# Patient Record
Sex: MALE | Race: WHITE | HISPANIC OR LATINO | Employment: FULL TIME | URBAN - METROPOLITAN AREA
[De-identification: names, ages, dates, MRNs, and addresses within clinical notes are randomized per-mention and may not be internally consistent; named-entity substitution may affect disease eponyms.]

---
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
## 2020-02-03 ENCOUNTER — TELEPHONE (OUTPATIENT)
Dept: SCHEDULING | Facility: IMAGING CENTER | Age: 20
End: 2020-02-03

## 2023-10-03 ENCOUNTER — OFFICE VISIT (OUTPATIENT)
Dept: URGENT CARE | Facility: CLINIC | Age: 23
End: 2023-10-03
Payer: MEDICAID

## 2023-10-03 ENCOUNTER — APPOINTMENT (OUTPATIENT)
Dept: RADIOLOGY | Facility: IMAGING CENTER | Age: 23
End: 2023-10-03
Attending: NURSE PRACTITIONER
Payer: MEDICAID

## 2023-10-03 ENCOUNTER — APPOINTMENT (OUTPATIENT)
Dept: URGENT CARE | Facility: CLINIC | Age: 23
End: 2023-10-03

## 2023-10-03 ENCOUNTER — APPOINTMENT (OUTPATIENT)
Dept: URGENT CARE | Facility: CLINIC | Age: 23
End: 2023-10-03
Payer: MEDICAID

## 2023-10-03 VITALS
BODY MASS INDEX: 31.39 KG/M2 | HEART RATE: 85 BPM | DIASTOLIC BLOOD PRESSURE: 88 MMHG | SYSTOLIC BLOOD PRESSURE: 136 MMHG | WEIGHT: 200 LBS | TEMPERATURE: 99 F | OXYGEN SATURATION: 98 % | RESPIRATION RATE: 16 BRPM | HEIGHT: 67 IN

## 2023-10-03 DIAGNOSIS — M25.571 ARTHRALGIA OF RIGHT FOOT: ICD-10-CM

## 2023-10-03 DIAGNOSIS — M79.674 TOE PAIN, RIGHT: ICD-10-CM

## 2023-10-03 DIAGNOSIS — L60.0 INGROWN TOENAIL: ICD-10-CM

## 2023-10-03 PROCEDURE — 3079F DIAST BP 80-89 MM HG: CPT | Performed by: NURSE PRACTITIONER

## 2023-10-03 PROCEDURE — 73660 X-RAY EXAM OF TOE(S): CPT | Mod: TC,RT | Performed by: NURSE PRACTITIONER

## 2023-10-03 PROCEDURE — 99204 OFFICE O/P NEW MOD 45 MIN: CPT | Performed by: NURSE PRACTITIONER

## 2023-10-03 PROCEDURE — 3075F SYST BP GE 130 - 139MM HG: CPT | Performed by: NURSE PRACTITIONER

## 2023-10-03 RX ORDER — FLUTICASONE PROPIONATE 44 UG/1
AEROSOL, METERED RESPIRATORY (INHALATION)
COMMUNITY
Start: 2023-08-02 | End: 2024-03-04 | Stop reason: SDUPTHER

## 2023-10-03 RX ORDER — ALBUTEROL SULFATE 0.63 MG/3ML
1 SOLUTION RESPIRATORY (INHALATION)
COMMUNITY
End: 2024-03-04

## 2023-10-03 RX ORDER — INDOMETHACIN 50 MG/1
50 CAPSULE ORAL 3 TIMES DAILY
Qty: 90 CAPSULE | Refills: 0 | Status: SHIPPED | OUTPATIENT
Start: 2023-10-03 | End: 2024-03-04

## 2023-10-03 ASSESSMENT — ENCOUNTER SYMPTOMS
JOINT SWELLING: 1
WEAKNESS: 0
NUMBNESS: 0

## 2023-10-04 NOTE — PROGRESS NOTES
"Subjective:   Shaggy Santiago Jr. is a 23 y.o. male who presents for Foot Swelling and Nail Problem      Foot Swelling  This is a new problem. The current episode started yesterday (left foot pain, no injury). The problem occurs constantly. The problem has been gradually worsening. Associated symptoms include joint swelling. Pertinent negatives include no numbness or weakness. The symptoms are aggravated by walking. He has tried acetaminophen for the symptoms. The treatment provided no relief.   Patient has no history of gout.  Does have an ingrown toenail on the right great toe was uncertain if it may be attributing to the swelling.  He has no pain of the ingrown.  Is scheduled to see a podiatrist to have ingrown toenails removed.    Review of Systems   Musculoskeletal:  Positive for joint pain and joint swelling.   Neurological:  Negative for weakness and numbness.       Medications:    albuterol  fluticasone Aero    Allergies: Patient has no known allergies.    Problem List: Shaggy Santiago Jr. does not have a problem list on file.    Surgical History:  No past surgical history on file.    Past Social Hx: Shaggy Santiago Jr.       Past Family Hx:  Shaggy Santiago Jr. family history is not on file.     Problem list, medications, and allergies reviewed by myself today in Epic.     Objective:     /88 (BP Location: Left arm, Patient Position: Sitting, BP Cuff Size: Adult)   Pulse 85   Temp 37.2 °C (99 °F) (Temporal)   Resp 16   Ht 1.702 m (5' 7\")   Wt 90.7 kg (200 lb)   SpO2 98%   BMI 31.32 kg/m²     Physical Exam  Constitutional:       Appearance: Normal appearance. He is not ill-appearing or toxic-appearing.   HENT:      Head: Normocephalic.      Right Ear: External ear normal.      Left Ear: External ear normal.      Nose: Nose normal.      Mouth/Throat:      Lips: Pink.   Eyes:      General: Lids are normal.   Pulmonary:      Effort: Pulmonary effort is normal. No accessory " muscle usage.   Musculoskeletal:      Cervical back: Full passive range of motion without pain.      Left foot: Decreased range of motion. Swelling, tenderness and bony tenderness (1st MTP) present.   Feet:      Right foot:      Skin integrity: No erythema.      Toenail Condition: Right toenails are ingrown.      Comments: Tissue surrounding ingrown nonerythematous, nonedematous, nontender.  Neurological:      Mental Status: He is alert and oriented to person, place, and time.   Psychiatric:         Mood and Affect: Mood normal.         Thought Content: Thought content normal.         Assessment/Plan:     Diagnosis and associated orders:     1. Arthralgia of right foot  DX-TOE(S) 2+ RIGHT    indomethacin (INDOCIN) 50 MG Cap      2. Ingrown toenail             Comments/MDM:     I independently reviewed the patient's imaging and agree with the interpretation of the radiologist.       Soft tissue swelling without acute osseous abnormality    I personally reviewed prior external notes and prior test results pertinent to today's visit. Patient with no acute injury to the right foot, x-rays negative for any acute findings.  Presentation possible inflammatory versus acute gout.  Patient does not have a history of gout no signs of infectious etiology.  Does have an ingrown toenail of the right great toe however is scheduled to see podiatry to have removal.  No signs of infection.  Will trial anti-inflammatory encouraged to rest, ice, elevate.  Did provide crutches.  Discussed management options, risks and benefits, and alternatives to treatment plan agreed upon.   Red flags discussed and indications to immediately call 911 or present to the Emergency Department.   Supportive care, differential diagnoses, and indications for immediate follow-up discussed with patient.    Patient expresses understanding and agrees to plan. Patient denies any other questions or concerns.                Please note that this dictation was  created using voice recognition software. I have made a reasonable attempt to correct obvious errors, but I expect that there are errors of grammar and possibly content that I did not discover before finalizing the note.    This note was electronically signed by Marcus QUACH.

## 2024-02-29 ENCOUNTER — OFFICE VISIT (OUTPATIENT)
Dept: URGENT CARE | Facility: CLINIC | Age: 24
End: 2024-02-29
Payer: MEDICAID

## 2024-02-29 VITALS
HEART RATE: 114 BPM | WEIGHT: 198.3 LBS | TEMPERATURE: 100.8 F | HEIGHT: 67 IN | SYSTOLIC BLOOD PRESSURE: 110 MMHG | DIASTOLIC BLOOD PRESSURE: 64 MMHG | OXYGEN SATURATION: 94 % | BODY MASS INDEX: 31.12 KG/M2 | RESPIRATION RATE: 16 BRPM

## 2024-02-29 DIAGNOSIS — J02.9 ACUTE PHARYNGITIS, UNSPECIFIED ETIOLOGY: ICD-10-CM

## 2024-02-29 LAB — S PYO DNA SPEC NAA+PROBE: DETECTED

## 2024-02-29 PROCEDURE — 99213 OFFICE O/P EST LOW 20 MIN: CPT | Performed by: NURSE PRACTITIONER

## 2024-02-29 PROCEDURE — 3078F DIAST BP <80 MM HG: CPT | Performed by: NURSE PRACTITIONER

## 2024-02-29 PROCEDURE — 87651 STREP A DNA AMP PROBE: CPT | Performed by: NURSE PRACTITIONER

## 2024-02-29 PROCEDURE — 3074F SYST BP LT 130 MM HG: CPT | Performed by: NURSE PRACTITIONER

## 2024-02-29 RX ORDER — AMOXICILLIN 400 MG/5ML
500 POWDER, FOR SUSPENSION ORAL 2 TIMES DAILY
Qty: 126 ML | Refills: 0 | Status: SHIPPED | OUTPATIENT
Start: 2024-02-29 | End: 2024-03-04

## 2024-02-29 NOTE — PROGRESS NOTES
Chief Complaint   Patient presents with    Pharyngitis     X 2 days    Palpitations    Fever    Nausea       HISTORY OF PRESENT ILLNESS: Patient is a pleasant 23 y.o. male who presents today due to complaints of a sore throat for the past two days. Reports associated fever, chills, pain with swallowing.  Patient also endorses elevated heart rate yesterday when he was feverish, denies today.  Pain is moderate. Denies associated rash, chest pain, urinary complaints, congestion, cough, or difficulty breathing.  He has tried OTC medications at home without much improvement.  Several of his family members have been diagnosed with strep throat.      There are no problems to display for this patient.      Allergies:Patient has no known allergies.    Current Outpatient Medications Ordered in Epic   Medication Sig Dispense Refill    amoxicillin (AMOXIL) 400 MG/5ML suspension Take 6.3 mL by mouth 2 times a day for 10 days. 126 mL 0    albuterol (ACCUNEB) 0.63 MG/3ML nebulizer solution Inhale 1 Ampule. (Patient not taking: Reported on 2/29/2024)      fluticasone (FLOVENT HFA) 44 MCG/ACT Aerosol INHALE 1 PUFF BY MOUTH 2 TIMES A DAY FOR 30 DAYS      indomethacin (INDOCIN) 50 MG Cap Take 1 Capsule by mouth 3 times a day. (Patient not taking: Reported on 2/29/2024) 90 Capsule 0    albuterol 108 (90 Base) MCG/ACT Aero Soln inhalation aerosol Inhale 2 Puffs by mouth every four hours as needed. 1 Inhaler 0     No current Epic-ordered facility-administered medications on file.       Past Medical History:   Diagnosis Date    ASTHMA        Social History     Tobacco Use    Smokeless tobacco: Never   Substance Use Topics    Alcohol use: No    Drug use: No       No family status information on file.   History reviewed. No pertinent family history.    ROS:  Review of Systems   Constitutional: Positive for fever, chills. Negative for weight loss, malaise, and fatigue.   HENT: Positive for sore throat. Negative for ear pain, nosebleeds,  "congestion.   Eyes: Negative for vision changes.   Cardiovascular: Positive for tachycardia.  Negative for chest pain, orthopnea and leg swelling.   Respiratory: Negative for cough, sputum production, shortness of breath and wheezing.   Gastrointestinal: Negative for abdominal pain, nausea, vomiting or diarrhea.   : Negative for changes in urination.   Skin: Negative for rash, diaphoresis.     Exam:  /64 (BP Location: Left arm, Patient Position: Sitting, BP Cuff Size: Large adult)   Pulse (!) 114   Temp (!) 38.2 °C (100.8 °F)   Resp 16   Ht 1.689 m (5' 6.5\")   Wt 89.9 kg (198 lb 4.8 oz)   SpO2 94%   General: well-nourished, well-developed male in NAD  Head: normocephalic, atraumatic  Eyes: PERRLA, no conjunctival injection, acuity grossly intact, lids normal.  Ears: normal shape and symmetry, no tenderness, no discharge. External canals are without any significant edema or erythema. Tympanic membranes are without any inflammation, no effusion. Gross auditory acuity is intact.  Nose: symmetrical without tenderness, no discharge.  Mouth/Throat: reasonable hygiene. There is erythema, with exudates and tonsillar enlargement present.  Neck: no masses, range of motion within normal limits, no tracheal deviation. No obvious thyroid enlargement.   Lymph: bilateral anterior cervical adenopathy. No supraclavicular adenopathy.   Neuro: alert and oriented. Cranial nerves 1-12 grossly intact. No sensory deficit.   Cardiovascular: Tachycardic rate auscultated at 105, regular rhythm. No edema.  Pulmonary: no distress. Chest is symmetrical with respiration, no wheezes, crackles, or rhonchi.   Musculoskeletal: no clubbing, appropriate muscle tone, gait is stable.  Skin: warm, dry, intact, no clubbing, no cyanosis, no rashes.   Psych: appropriate mood, affect, judgement.           Assessment/Plan:  1. Acute pharyngitis, unspecified etiology  POCT GROUP A STREP, PCR    amoxicillin (AMOXIL) 400 MG/5ML suspension    "         Patient presents with symptoms since yesterday, suspect strep due to exposure and presentation.  Amoxicillin as directed.  Patient is slightly tachycardic in clinic, reports tachycardia last night, is otherwise well-appearing.  Feel patient is appropriate for outpatient treatment with strict ER precautions, he is encouraged to hydrate.  Antibiotic as directed. OTC motrin or tylenol for pain/fever control. Hand hygiene. Increase fluid intake, rest. Warm salt water gargles.   Supportive care, differential diagnoses, and indications for immediate follow-up discussed with patient.   Pathogenesis of diagnosis discussed including typical length and natural progression.   Instructed to return to clinic or nearest emergency department for any change in condition, further concerns, or worsening of symptoms.  Patient states understanding of the plan of care and discharge instructions.  Instructed to make an appointment, for follow up, with his primary care provider.        Please note that this dictation was created using voice recognition software. I have made every reasonable attempt to correct obvious errors, but I expect that there are errors of grammar and possibly content that I did not discover before finalizing the note.       LEXUS Reilly.

## 2024-02-29 NOTE — LETTER
February 29, 2024         Patient: Shaggy Huston   YOB: 2000   Date of Visit: 2/29/2024           To Whom it May Concern:    Shaggy Huston was seen in my clinic on 2/29/2024. He may be excused from work today and tomorrow.     If you have any questions or concerns, please don't hesitate to call.        Sincerely,           LEXUS Reilly.  Electronically Signed

## 2024-03-04 ENCOUNTER — OFFICE VISIT (OUTPATIENT)
Dept: MEDICAL GROUP | Facility: MEDICAL CENTER | Age: 24
End: 2024-03-04
Attending: NURSE PRACTITIONER
Payer: MEDICAID

## 2024-03-04 VITALS
WEIGHT: 193.1 LBS | BODY MASS INDEX: 31.03 KG/M2 | HEIGHT: 66 IN | RESPIRATION RATE: 16 BRPM | TEMPERATURE: 97.8 F | SYSTOLIC BLOOD PRESSURE: 122 MMHG | OXYGEN SATURATION: 96 % | DIASTOLIC BLOOD PRESSURE: 70 MMHG | HEART RATE: 63 BPM

## 2024-03-04 DIAGNOSIS — Z13.21 SCREENING FOR ENDOCRINE, NUTRITIONAL, METABOLIC AND IMMUNITY DISORDER: ICD-10-CM

## 2024-03-04 DIAGNOSIS — Z11.3 ROUTINE SCREENING FOR STI (SEXUALLY TRANSMITTED INFECTION): ICD-10-CM

## 2024-03-04 DIAGNOSIS — Z13.29 SCREENING FOR ENDOCRINE, NUTRITIONAL, METABOLIC AND IMMUNITY DISORDER: ICD-10-CM

## 2024-03-04 DIAGNOSIS — Z11.59 NEED FOR HEPATITIS C SCREENING TEST: ICD-10-CM

## 2024-03-04 DIAGNOSIS — Z23 NEED FOR VACCINATION: ICD-10-CM

## 2024-03-04 DIAGNOSIS — J45.909 ASTHMA, UNSPECIFIED ASTHMA SEVERITY, UNSPECIFIED WHETHER COMPLICATED, UNSPECIFIED WHETHER PERSISTENT: ICD-10-CM

## 2024-03-04 DIAGNOSIS — Z13.228 SCREENING FOR ENDOCRINE, NUTRITIONAL, METABOLIC AND IMMUNITY DISORDER: ICD-10-CM

## 2024-03-04 DIAGNOSIS — Z13.6 SCREENING FOR CARDIOVASCULAR CONDITION: ICD-10-CM

## 2024-03-04 DIAGNOSIS — Z13.0 SCREENING FOR ENDOCRINE, NUTRITIONAL, METABOLIC AND IMMUNITY DISORDER: ICD-10-CM

## 2024-03-04 DIAGNOSIS — Z84.82 FAMILY HISTORY OF SIDS (SUDDEN INFANT DEATH SYNDROME): ICD-10-CM

## 2024-03-04 PROCEDURE — 99214 OFFICE O/P EST MOD 30 MIN: CPT | Performed by: NURSE PRACTITIONER

## 2024-03-04 PROCEDURE — 90715 TDAP VACCINE 7 YRS/> IM: CPT

## 2024-03-04 PROCEDURE — 3074F SYST BP LT 130 MM HG: CPT | Performed by: NURSE PRACTITIONER

## 2024-03-04 PROCEDURE — 99213 OFFICE O/P EST LOW 20 MIN: CPT | Performed by: NURSE PRACTITIONER

## 2024-03-04 PROCEDURE — 3078F DIAST BP <80 MM HG: CPT | Performed by: NURSE PRACTITIONER

## 2024-03-04 RX ORDER — ALBUTEROL SULFATE 90 UG/1
2 AEROSOL, METERED RESPIRATORY (INHALATION) EVERY 6 HOURS PRN
Qty: 18 G | Refills: 4 | Status: SHIPPED | OUTPATIENT
Start: 2024-03-04

## 2024-03-04 RX ORDER — FLUTICASONE PROPIONATE 44 UG/1
2 AEROSOL, METERED RESPIRATORY (INHALATION) 2 TIMES DAILY
Qty: 10.6 G | Refills: 4 | Status: SHIPPED | OUTPATIENT
Start: 2024-03-04

## 2024-03-04 ASSESSMENT — PATIENT HEALTH QUESTIONNAIRE - PHQ9: CLINICAL INTERPRETATION OF PHQ2 SCORE: 0

## 2024-03-04 NOTE — PROGRESS NOTES
Verbal consent was acquired by the patient to use "[employer] ambient listening note generation during this visit     Chief Complaint   Patient presents with    Establish Care    Referral Needed       Subjective:     HPI:   History of Present Illness  The patient presents for evaluation of multiple medical concerns. He is accompanied by an adult female.    He was told that he needs to see a cardiologist because his 2 children passed [facility] from [facility], one in 01/2020 and one in 06/2020. They also need to do genetic testing. He has not seen a doctor in years.    He has been diagnosed with hypertension stage 1 every time he went to the ER. He bought a blood pressure monitor and has been checking his blood pressure at home. When he first started checking, it was pretty high at 123 to 125 over high 80s. Lately, he started drinking a biscuits tea, which has brought it down significantly. This morning, it was 112/76. He has also tried to cut down on the sodium in his diet and tries to be a little more healthier.    He has had asthma since he was 3 or 4 years old. It is not always well controlled. He takes fluticasone. He had been prescribed fluticasone at [facility], but they ended up prescribing him the exact same thing. He has been using the adult female's fluticasone because she does not take it anymore. He needs a prescription for that. He is not aware of having lung testing done for his asthma.    He is currently on the 6th day of his treatment for strep throat. He got it at the urgent care.   He denies vaping or smoking. He drinks alcohol occasionally.   His father had heart problems and diabetes. He had an open heart surgery. He passed away at 67.   He is overdue for his tetanus vaccine.He had an ingrown toenail removed.        No problems updated.    ROS  See HPI     No Known Allergies    Current medicines (including changes today)  Current Outpatient Medications   Medication Sig Dispense Refill     "fluticasone (FLOVENT HFA) 44 MCG/ACT Aerosol Inhale 2 Puffs 2 times a day. 10.6 g 4    albuterol 108 (90 Base) MCG/ACT Aero Soln inhalation aerosol Inhale 2 Puffs every 6 hours as needed for Shortness of Breath. 18 g 4     No current facility-administered medications for this visit.       Social History     Tobacco Use    Smoking status: Never    Smokeless tobacco: Never   Vaping Use    Vaping Use: Never used   Substance Use Topics    Alcohol use: Yes     Comment: occassionally - weekly    Drug use: No       There are no problems to display for this patient.      Family History   Problem Relation Age of Onset    Heart Disease Father           Objective:     /70 (BP Location: Left arm, Patient Position: Sitting, BP Cuff Size: Large adult)   Pulse 63   Temp 36.6 °C (97.8 °F) (Temporal)   Resp 16   Ht 1.676 m (5' 6\")   Wt 87.6 kg (193 lb 1.6 oz)   SpO2 96%  Body mass index is 31.17 kg/m².    Physical Exam:  Physical Exam  Vitals reviewed.   Constitutional:       General: He is awake.      Appearance: Normal appearance. He is well-developed.   HENT:      Head: Normocephalic.   Eyes:      Conjunctiva/sclera: Conjunctivae normal.   Cardiovascular:      Rate and Rhythm: Normal rate and regular rhythm.      Heart sounds: Normal heart sounds.   Pulmonary:      Effort: Pulmonary effort is normal. No respiratory distress.      Breath sounds: Normal breath sounds. No wheezing.   Musculoskeletal:      Cervical back: Neck supple.   Skin:     General: Skin is warm and dry.   Neurological:      Mental Status: He is alert and oriented to person, place, and time.   Psychiatric:         Mood and Affect: Mood normal.         Behavior: Behavior normal. Behavior is cooperative.              Assessment and Plan:     The following treatment plan was discussed:    Problem List Items Addressed This Visit    None  Visit Diagnoses       Asthma, unspecified asthma severity, unspecified whether complicated, unspecified whether " persistent        Relevant Medications    fluticasone (FLOVENT HFA) 44 MCG/ACT Aerosol    albuterol 108 (90 Base) MCG/ACT Aero Soln inhalation aerosol    Other Relevant Orders    PULMONARY FUNCTION TESTS -Test requested: Complete Pulmonary Function Test    Need for vaccination        Relevant Orders    Tdap =>8yo IM (Completed)    Screening for cardiovascular condition        Relevant Orders    HEMOGLOBIN A1C    Lipid Profile    Comp Metabolic Panel    Need for hepatitis C screening test        Relevant Orders    HEP C VIRUS ANTIBODY    Screening for endocrine, nutritional, metabolic and immunity disorder        Relevant Orders    VITAMIN D,25 HYDROXY (DEFICIENCY)    Routine screening for STI (sexually transmitted infection)        Relevant Orders    HIV AG/AB COMBO ASSAY SCREENING    Family history of SIDS (sudden infant death syndrome)        Relevant Orders    REFERRAL TO CARDIOLOGY    Referral to Genetics            Assessment & Plan  1. Asthma.  The patient has had longstanding history of asthma and was well controlled on albuterol rescue inhaler and fluticasone. However, he has not been to the doctor in quite some time. I will refill his fluticasone and albuterol and send him for pulmonary function testing. He will follow up with me after this is complete.    2. Family history of SIDS.  The patient has had 2 children die of sudden infant death syndrome and was recommended to get tested by cardiology as well as genetics to ensure no abnormalities. Referral to cardiology placed. Referral to genetics placed.    3. Establish care.  The patient states it has been a while since he has seen a provider. He is overdue for his preventative labs and we will order those today. He is also overdue for his Tdap and that was provided in clinic. He is currently on amoxicillin to treat strep throat and is on day 6 with 4 more remaining. I encouraged to continue with that prescription to ensure he adequately treats his strep  throat infection.    Follow-up  The patient will go and obtain labs and follow up with me after this is completed.    Any change or worsening of signs or symptoms, patient encouraged to follow-up or report to emergency room for further evaluation. Patient verbalizes understanding and agrees.      PLEASE NOTE: This dictation was created using voice recognition software. I have made every reasonable attempt to correct obvious errors, but I expect that there are errors of grammar and possibly content that I did not discover before finalizing the note.

## 2024-04-01 ENCOUNTER — TELEPHONE (OUTPATIENT)
Dept: HEALTH INFORMATION MANAGEMENT | Facility: OTHER | Age: 24
End: 2024-04-01
Payer: MEDICAID

## 2024-04-10 ENCOUNTER — APPOINTMENT (OUTPATIENT)
Dept: URGENT CARE | Facility: CLINIC | Age: 24
End: 2024-04-10
Payer: MEDICAID

## 2024-04-10 ENCOUNTER — OFFICE VISIT (OUTPATIENT)
Dept: URGENT CARE | Facility: CLINIC | Age: 24
End: 2024-04-10
Payer: MEDICAID

## 2024-04-10 VITALS
SYSTOLIC BLOOD PRESSURE: 124 MMHG | RESPIRATION RATE: 14 BRPM | HEART RATE: 87 BPM | WEIGHT: 195 LBS | TEMPERATURE: 98.6 F | HEIGHT: 67 IN | BODY MASS INDEX: 30.61 KG/M2 | DIASTOLIC BLOOD PRESSURE: 72 MMHG | OXYGEN SATURATION: 94 %

## 2024-04-10 DIAGNOSIS — J02.9 PHARYNGITIS, UNSPECIFIED ETIOLOGY: ICD-10-CM

## 2024-04-10 DIAGNOSIS — J01.00 ACUTE NON-RECURRENT MAXILLARY SINUSITIS: ICD-10-CM

## 2024-04-10 DIAGNOSIS — Z20.818 EXPOSURE TO STREP THROAT: ICD-10-CM

## 2024-04-10 LAB — S PYO DNA SPEC NAA+PROBE: NOT DETECTED

## 2024-04-10 PROCEDURE — 3074F SYST BP LT 130 MM HG: CPT | Performed by: NURSE PRACTITIONER

## 2024-04-10 PROCEDURE — 87651 STREP A DNA AMP PROBE: CPT | Performed by: NURSE PRACTITIONER

## 2024-04-10 PROCEDURE — 99213 OFFICE O/P EST LOW 20 MIN: CPT | Performed by: NURSE PRACTITIONER

## 2024-04-10 PROCEDURE — 3078F DIAST BP <80 MM HG: CPT | Performed by: NURSE PRACTITIONER

## 2024-04-10 RX ORDER — AMOXICILLIN AND CLAVULANATE POTASSIUM 875; 125 MG/1; MG/1
1 TABLET, FILM COATED ORAL 2 TIMES DAILY
Qty: 14 TABLET | Refills: 0 | Status: SHIPPED | OUTPATIENT
Start: 2024-04-10 | End: 2024-04-17

## 2024-04-10 ASSESSMENT — ENCOUNTER SYMPTOMS
HEADACHES: 0
SORE THROAT: 1
CHILLS: 0
SWOLLEN GLANDS: 0
COUGH: 0
TROUBLE SWALLOWING: 0
FEVER: 0

## 2024-04-11 NOTE — PROGRESS NOTES
"Subjective:   Shaggy Huston is a 23 y.o. male who presents for Other (Children tested positive for strep, getting symptoms wants swabs)      Pharyngitis   This is a new problem. Episode onset: all kids positive strep. The problem has been unchanged. Neither side of throat is experiencing more pain than the other. The pain is mild. Pertinent negatives include no congestion, coughing, headaches, swollen glands or trouble swallowing. He has had exposure to strep.       Review of Systems   Constitutional:  Negative for chills, fever and malaise/fatigue.   HENT:  Positive for sore throat. Negative for congestion and trouble swallowing.    Respiratory:  Negative for cough.    Neurological:  Negative for headaches.       Medications:    albuterol Aers  fluticasone Aero    Allergies: Patient has no known allergies.    Problem List: Shaggy Huston does not have a problem list on file.    Surgical History:  No past surgical history on file.    Past Social Hx: Shaggy Huston  reports that he has never smoked. He has never used smokeless tobacco. He reports current alcohol use. He reports that he does not use drugs.     Past Family Hx:  Shaggy Huston family history includes Heart Disease in his father.     Problem list, medications, and allergies reviewed by myself today in Epic.     Objective:     /72   Pulse 87   Temp 37 °C (98.6 °F) (Temporal)   Resp 14   Ht 1.702 m (5' 7\")   Wt 88.5 kg (195 lb)   SpO2 94%   BMI 30.54 kg/m²     Physical Exam  Constitutional:       General: He is not in acute distress.     Appearance: He is well-developed.   HENT:      Head: Normocephalic and atraumatic.      Mouth/Throat:      Mouth: Mucous membranes are moist.      Pharynx: Pharyngeal swelling and posterior oropharyngeal erythema present.   Eyes:      Conjunctiva/sclera: Conjunctivae normal.   Cardiovascular:      Rate and Rhythm: Normal rate and regular rhythm.   Pulmonary:      " Effort: Pulmonary effort is normal. No respiratory distress.      Breath sounds: Normal breath sounds.   Musculoskeletal:      Cervical back: No rigidity.   Lymphadenopathy:      Cervical: No cervical adenopathy.   Skin:     General: Skin is warm and dry.      Capillary Refill: Capillary refill takes less than 2 seconds.   Neurological:      Mental Status: He is alert and oriented to person, place, and time.      Sensory: No sensory deficit.      Deep Tendon Reflexes: Reflexes are normal and symmetric.   Psychiatric:         Mood and Affect: Mood normal.         Behavior: Behavior normal.         Assessment/Plan:     Diagnosis and associated orders:     1. Pharyngitis, unspecified etiology  POCT GROUP A STREP, PCR      2. Exposure to strep throat        3. Acute non-recurrent maxillary sinusitis  amoxicillin-clavulanate (AUGMENTIN) 875-125 MG Tab             Comments/MDM:     .  Results for orders placed or performed in visit on 04/10/24   POCT GROUP A STREP, PCR   Result Value Ref Range    POC Group A Strep, PCR Not Detected Not Detected, Invalid       . I personally reviewed prior external notes and prior test results pertinent to today's visit.   Discussed management options, risks and benefits, and alternatives to treatment plan agreed upon.   Red flags discussed and indications to immediately call 911 or present to the Emergency Department.   Supportive care, differential diagnoses, and indications for immediate follow-up discussed with patient.    Patient expresses understanding and agrees to plan. Patient denies any other questions or concerns.              Please note that this dictation was created using voice recognition software. I have made a reasonable attempt to correct obvious errors, but I expect that there are errors of grammar and possibly content that I did not discover before finalizing the note.    This note was electronically signed by Marcus STOKES

## 2024-04-19 ENCOUNTER — HOSPITAL ENCOUNTER (OUTPATIENT)
Dept: LAB | Facility: MEDICAL CENTER | Age: 24
End: 2024-04-19
Attending: NURSE PRACTITIONER
Payer: MEDICAID

## 2024-04-19 DIAGNOSIS — Z13.6 SCREENING FOR CARDIOVASCULAR CONDITION: ICD-10-CM

## 2024-04-19 DIAGNOSIS — Z11.3 ROUTINE SCREENING FOR STI (SEXUALLY TRANSMITTED INFECTION): ICD-10-CM

## 2024-04-19 DIAGNOSIS — Z13.0 SCREENING FOR ENDOCRINE, NUTRITIONAL, METABOLIC AND IMMUNITY DISORDER: ICD-10-CM

## 2024-04-19 DIAGNOSIS — Z13.21 SCREENING FOR ENDOCRINE, NUTRITIONAL, METABOLIC AND IMMUNITY DISORDER: ICD-10-CM

## 2024-04-19 DIAGNOSIS — Z11.59 NEED FOR HEPATITIS C SCREENING TEST: ICD-10-CM

## 2024-04-19 DIAGNOSIS — Z13.29 SCREENING FOR ENDOCRINE, NUTRITIONAL, METABOLIC AND IMMUNITY DISORDER: ICD-10-CM

## 2024-04-19 DIAGNOSIS — Z13.228 SCREENING FOR ENDOCRINE, NUTRITIONAL, METABOLIC AND IMMUNITY DISORDER: ICD-10-CM

## 2024-04-19 LAB
25(OH)D3 SERPL-MCNC: 15 NG/ML (ref 30–100)
ALBUMIN SERPL BCP-MCNC: 4.7 G/DL (ref 3.2–4.9)
ALBUMIN/GLOB SERPL: 1.6 G/DL
ALP SERPL-CCNC: 69 U/L (ref 30–99)
ALT SERPL-CCNC: 53 U/L (ref 2–50)
ANION GAP SERPL CALC-SCNC: 14 MMOL/L (ref 7–16)
AST SERPL-CCNC: 46 U/L (ref 12–45)
BILIRUB SERPL-MCNC: 0.8 MG/DL (ref 0.1–1.5)
BUN SERPL-MCNC: 24 MG/DL (ref 8–22)
CALCIUM ALBUM COR SERPL-MCNC: 8.5 MG/DL (ref 8.5–10.5)
CALCIUM SERPL-MCNC: 9.1 MG/DL (ref 8.5–10.5)
CHLORIDE SERPL-SCNC: 102 MMOL/L (ref 96–112)
CHOLEST SERPL-MCNC: 172 MG/DL (ref 100–199)
CO2 SERPL-SCNC: 23 MMOL/L (ref 20–33)
CREAT SERPL-MCNC: 1.41 MG/DL (ref 0.5–1.4)
EST. AVERAGE GLUCOSE BLD GHB EST-MCNC: 114 MG/DL
GFR SERPLBLD CREATININE-BSD FMLA CKD-EPI: 71 ML/MIN/1.73 M 2
GLOBULIN SER CALC-MCNC: 3 G/DL (ref 1.9–3.5)
GLUCOSE SERPL-MCNC: 95 MG/DL (ref 65–99)
HBA1C MFR BLD: 5.6 % (ref 4–5.6)
HCV AB SER QL: NORMAL
HDLC SERPL-MCNC: 40 MG/DL
HIV 1+2 AB+HIV1 P24 AG SERPL QL IA: NORMAL
LDLC SERPL CALC-MCNC: 117 MG/DL
POTASSIUM SERPL-SCNC: 4.1 MMOL/L (ref 3.6–5.5)
PROT SERPL-MCNC: 7.7 G/DL (ref 6–8.2)
SODIUM SERPL-SCNC: 139 MMOL/L (ref 135–145)
TRIGL SERPL-MCNC: 75 MG/DL (ref 0–149)

## 2024-04-19 PROCEDURE — 80053 COMPREHEN METABOLIC PANEL: CPT

## 2024-04-19 PROCEDURE — 83036 HEMOGLOBIN GLYCOSYLATED A1C: CPT

## 2024-04-19 PROCEDURE — 36415 COLL VENOUS BLD VENIPUNCTURE: CPT

## 2024-04-19 PROCEDURE — 86803 HEPATITIS C AB TEST: CPT

## 2024-04-19 PROCEDURE — 82306 VITAMIN D 25 HYDROXY: CPT

## 2024-04-19 PROCEDURE — 87389 HIV-1 AG W/HIV-1&-2 AB AG IA: CPT

## 2024-04-19 PROCEDURE — 80061 LIPID PANEL: CPT

## 2024-04-29 ENCOUNTER — OFFICE VISIT (OUTPATIENT)
Dept: MEDICAL GROUP | Facility: MEDICAL CENTER | Age: 24
End: 2024-04-29
Attending: NURSE PRACTITIONER
Payer: MEDICAID

## 2024-04-29 ENCOUNTER — TELEPHONE (OUTPATIENT)
Dept: CARDIOLOGY | Facility: MEDICAL CENTER | Age: 24
End: 2024-04-29

## 2024-04-29 VITALS
BODY MASS INDEX: 31.01 KG/M2 | WEIGHT: 197.6 LBS | DIASTOLIC BLOOD PRESSURE: 74 MMHG | HEART RATE: 75 BPM | TEMPERATURE: 97.6 F | SYSTOLIC BLOOD PRESSURE: 120 MMHG | RESPIRATION RATE: 16 BRPM | HEIGHT: 67 IN | OXYGEN SATURATION: 95 %

## 2024-04-29 DIAGNOSIS — J45.909 ASTHMA, UNSPECIFIED ASTHMA SEVERITY, UNSPECIFIED WHETHER COMPLICATED, UNSPECIFIED WHETHER PERSISTENT: ICD-10-CM

## 2024-04-29 DIAGNOSIS — E55.9 VITAMIN D DEFICIENCY: ICD-10-CM

## 2024-04-29 PROCEDURE — 99212 OFFICE O/P EST SF 10 MIN: CPT | Performed by: NURSE PRACTITIONER

## 2024-04-29 RX ORDER — FLUTICASONE PROPIONATE 44 UG/1
2 AEROSOL, METERED RESPIRATORY (INHALATION) 2 TIMES DAILY
Qty: 10.6 G | Refills: 4 | Status: SHIPPED | OUTPATIENT
Start: 2024-04-29

## 2024-04-29 RX ORDER — ERGOCALCIFEROL 1.25 MG/1
50000 CAPSULE ORAL
Qty: 12 CAPSULE | Refills: 0 | Status: SHIPPED | OUTPATIENT
Start: 2024-04-29

## 2024-04-29 NOTE — TELEPHONE ENCOUNTER
Chart Prep     Spoke to patient in regards to records for NP appointment with Dr. Drake on 01.01.2024. Patient has never been treated by a cardiologist, all recent records in epic including labs, imaging and cardiac testing. Confirmed appointment date, time and location.

## 2024-04-29 NOTE — PROGRESS NOTES
Verbal consent was acquired by the patient to use [employer] ambient listening note generation during this visit     Chief Complaint   Patient presents with    Lab Results       Subjective:     HPI:   History of Present Illness  The patient presents for evaluation of multiple medical concerns.    The patient has expressed concerns regarding elevated LDL levels.    The patient has observed a deficiency in vitamin D levels and is seeking advice on appropriate treatment options.    The patient occasionally experiences difficulty in swallowing tablets, a condition that has resulted in a traumatized incident in the past. The patient has been self-crushing the tablets and has found relief from liquid or powder Tylenol.    Supplemental Information  He has scheduled PFTs this Wednesday and pulmonary function next Monday. His breathing has been a lot better. He has not had an asthma exacerbation. He has been taking the fluticasone more consistently.        No problems updated.    ROS  See HPI     No Known Allergies    Current medicines (including changes today)  Current Outpatient Medications   Medication Sig Dispense Refill    ergocalciferol (DRISDOL) 48252 UNIT capsule Take 1 Capsule by mouth every 7 days. 12 Capsule 0    fluticasone (FLOVENT HFA) 44 MCG/ACT Aerosol Inhale 2 Puffs 2 times a day. 10.6 g 4    albuterol 108 (90 Base) MCG/ACT Aero Soln inhalation aerosol Inhale 2 Puffs every 6 hours as needed for Shortness of Breath. 18 g 4     No current facility-administered medications for this visit.       Social History     Tobacco Use    Smoking status: Never    Smokeless tobacco: Never   Vaping Use    Vaping Use: Never used   Substance Use Topics    Alcohol use: Yes     Comment: occassionally - weekly    Drug use: No       There are no problems to display for this patient.      Family History   Problem Relation Age of Onset    Heart Disease Father           Objective:     /74 (BP Location: Right arm, Patient  "Position: Sitting, BP Cuff Size: Large adult)   Pulse 75   Temp 36.4 °C (97.6 °F) (Temporal)   Resp 16   Ht 1.702 m (5' 7\")   Wt 89.6 kg (197 lb 9.6 oz)   SpO2 95%  Body mass index is 30.95 kg/m².    Physical Exam:  Physical Exam  Vitals reviewed.   Constitutional:       General: He is awake.      Appearance: Normal appearance. He is well-developed.   HENT:      Head: Normocephalic.   Eyes:      Conjunctiva/sclera: Conjunctivae normal.   Cardiovascular:      Rate and Rhythm: Normal rate.   Pulmonary:      Effort: Pulmonary effort is normal. No respiratory distress.      Breath sounds: Normal breath sounds.   Musculoskeletal:      Cervical back: Neck supple.   Skin:     General: Skin is warm and dry.   Neurological:      Mental Status: He is alert and oriented to person, place, and time.   Psychiatric:         Mood and Affect: Mood normal.         Behavior: Behavior normal. Behavior is cooperative.              Assessment and Plan:     The following treatment plan was discussed:    Problem List Items Addressed This Visit    None  Visit Diagnoses       Vitamin D deficiency        Relevant Medications    ergocalciferol (DRISDOL) 58718 UNIT capsule    Asthma, unspecified asthma severity, unspecified whether complicated, unspecified whether persistent        Relevant Medications    fluticasone (FLOVENT HFA) 44 MCG/ACT Aerosol            Assessment & Plan  1. Elevated liver enzymes.  The patient was informed that elevated liver enzymes are a consequence of illness, alcohol consumption, or excessive use of Tylenol. However, the patient acknowledges the increased intake of Tylenol and plans to reduce its use. The patient has no history of elevated liver enzymes. The patient was advised to increase hydration.    2. Elevated LDL.  The patient was informed that elevated LDL levels can occur due to illness, alcohol consumption, or excessive Tylenol use. The patient was encouraged to maintain a healthy diet and regular " exercise regimen. At present, the patient's LDL levels are not significantly concerning, but close monitoring will be conducted to ensure that the elevated cholesterol does not deteriorate.    3. Vitamin D deficiency.  The patient's Vitamin D levels are significantly low. The patient will be prescribed ergocalciferol 50,000 units to be taken orally once weekly for the next 12 weeks. Subsequently, the patient can transition to over-the-counter supplementation.    Any change or worsening of signs or symptoms, patient encouraged to follow-up or report to emergency room for further evaluation. Patient verbalizes understanding and agrees.      PLEASE NOTE: This dictation was created using voice recognition software. I have made every reasonable attempt to correct obvious errors, but I expect that there are errors of grammar and possibly content that I did not discover before finalizing the note.

## 2024-04-30 NOTE — PROGRESS NOTES
CARDIOLOGY CONSULTATION NOTE      Date of Visit: 2024    Primary Care Provider: RODRIGUEZ Osborne    Patient Name: Shaggy Quesada  YOB: 2000  MRN: [id number]     Reason for Visit:   History of sudden infant death in multiple children     Patient Story:   Shaggy Arriola is a 23 year-old gentleman with no known history of cardiovascular disease.  Unfortunately, he has had 2 children passed away from SIDS.  One of his children  earlier this year and his pediatrician requested that he and his remaining family members undergo genetic testing as well as a cardiology evaluation.  He does have 3 surviving children surviving.    He presents today for consultation.  He denies any known history of cardiovascular disease.  He also notes no history of sudden death in other family members.  He occasionally has some shortness of breath that he attributes to asthma and twinges of chest pain, but overall feels well.     Medications and Allergies:     Current Outpatient Medications   Medication Sig Dispense Refill    ergocalciferol (DRISDOL) 81467 UNIT capsule Take 1 Capsule by mouth every 7 days. 12 Capsule 0    fluticasone (FLOVENT HFA) 44 MCG/ACT Aerosol Inhale 2 Puffs 2 times a day. 10.6 g 4    albuterol 108 (90 Base) MCG/ACT Aero Soln inhalation aerosol Inhale 2 Puffs every 6 hours as needed for Shortness of Breath. 18 g 4     No current facility-administered medications for this visit.     No Known Allergies     Medical Decision Making:   Fortunately, his ECG shows a normal QT length and does not have concerning features for WPW or Brugada syndrome.  We discussed that most cases of SIDS are probably not related to a primary cardiovascular issue, but genetic testing was reasonable given 2 episodes in his immediate family.  We will also obtain an echocardiogram to exclude other structural abnormality such as hypertrophic cardiomyopathy.    Follow Up  Pending  "findings of echocardiogram and genetic testing.  He was instructed to call the office for another clinic visit if his genetic testing discovered a potentially serious mutation in a cardiovascular gene.     Cardiac Studies and Procedures:   Echocardiography  Pending    Electrophysiology  ECG (5/1/2024)  Sinus bradycardia with PACs, otherwise within normal limits.Dictation #1  MRN:[id number]  CSN:[id number]      Vital Signs:   /76 (BP Location: Left arm, Patient Position: Sitting)   Pulse 62   Resp 16   Ht 1.702 m (5' 7\")   Wt 89.4 kg (197 lb)   SpO2 100%    BP Readings from Last 4 Encounters:   05/01/24 120/76   04/29/24 120/74   04/10/24 124/72   03/04/24 122/70     Wt Readings from Last 4 Encounters:   05/01/24 89.4 kg (197 lb)   04/29/24 89.6 kg (197 lb 9.6 oz)   04/10/24 88.5 kg (195 lb)   03/04/24 87.6 kg (193 lb 1.6 oz)     Body mass index is 30.85 kg/m².     Laboratories:   Lipids  Lab Results   Component Value Date/Time     (H) 04/19/2024 10:13 AM       Lab Results   Component Value Date/Time    HDL 40 04/19/2024 10:13 AM       Lab Results   Component Value Date/Time    TRIGLYCERIDE 75 04/19/2024 10:13 AM       Lab Results   Component Value Date/Time    CHOLSTRLTOT 172 04/19/2024 10:13 AM       No components found for: \"LPA\"      Chemistries  Lab Results   Component Value Date/Time    CREATININE 1.41 (H) 04/19/2024 10:13 AM    CREATININE 1.37 (H) 05/06/2021 02:10 PM    CREATININE 1.43 (H) 10/19/2018 08:34 AM    CREATININE 1.17 09/20/2017 04:51 AM     Lab Results   Component Value Date/Time    BUN 24 (H) 04/19/2024 10:13 AM    BUN 19 (H) 05/06/2021 02:10 PM    BUN 22 (H) 10/19/2018 08:34 AM    BUN 14 09/20/2017 04:51 AM     Lab Results   Component Value Date/Time    POTASSIUM 4.1 04/19/2024 10:13 AM    POTASSIUM 4.2 05/06/2021 02:10 PM    POTASSIUM 3.8 10/19/2018 08:34 AM    POTASSIUM 3.6 09/20/2017 04:51 AM     Lab Results   Component Value Date/Time    SODIUM 139 04/19/2024 10:13 AM    SODIUM " "137 05/06/2021 02:10 PM    SODIUM 139 10/19/2018 08:34 AM    SODIUM 137 09/20/2017 04:51 AM     Lab Results   Component Value Date/Time    GLUCOSE 95 04/19/2024 10:13 AM    GLUCOSE 92 05/06/2021 02:10 PM    GLUCOSE 88 10/19/2018 08:34 AM    GLUCOSE 96 09/20/2017 04:51 AM     Lab Results   Component Value Date/Time    ASTSGOT 46 (H) 04/19/2024 10:13 AM    ASTSGOT 26 09/20/2017 04:51 AM     Lab Results   Component Value Date/Time    ALTSGPT 53 (H) 04/19/2024 10:13 AM    ALTSGPT 22 09/20/2017 04:51 AM     Lab Results   Component Value Date/Time    ALKPHOSPHAT 69 04/19/2024 10:13 AM    ALKPHOSPHAT 69 (L) 09/20/2017 04:51 AM     Lab Results   Component Value Date/Time    HBA1C 5.6 04/19/2024 10:13 AM     No results found for: \"TSH\"  No results found for: \"NTPROBNP\"  No results found for: \"TROPONINT\"    Blood Counts  Lab Results   Component Value Date/Time    HEMOGLOBIN 17.9 05/06/2021 02:10 PM    HEMOGLOBIN 16.9 10/19/2018 08:34 AM    HEMOGLOBIN 18.4 (H) 09/20/2017 04:51 AM     Lab Results   Component Value Date/Time    PLATELETCT 174 05/06/2021 02:10 PM    PLATELETCT 145 10/19/2018 08:34 AM    PLATELETCT 133 (L) 09/20/2017 04:51 AM     Lab Results   Component Value Date/Time    WBC 12.5 (H) 09/20/2017 04:51 AM        Physical Examination:   General: Well-appearing, no acute distress  Eyes: Extraocular movements intact, anicteric  Ears: No ear lobe crease  Neck: Full range of motion, no jugular venous distension  Pulmonary: Normal respiratory effort, clear to auscultation bilaterally  Cardiovascular: Regular rate and rhythm, no murmurs or gallops appreciated  Gastrointestinal: Soft, non-distended  Extremities: Warm and well perfused, no lower extremity edema  Neurological: Alert and oriented, no gross focal motor deficits  Psychiatric: Normal affect, normal judgment     Past History:   Past Medical History  The patient's past medical history was reviewed.  See HPI and self-reported patient medical history form for " pertinent medical history to consultation.    Past Social History  The patient's social history was reviewed.  See hospitals self-reported patient medical history form for pertinent social history to consultation.    Past Family History  The patient's family history was reviewed.  See hospitals self-reported patient medical history form for pertinent family history to consultation.    Review of Systems  A pertinent cardiac review of systems was performed and was otherwise unremarkable except as per HPI and self-reported patient medical history form.        Dereck Drake MD, [facility]  Interventional Cardiology  [facility], [facility]  [address] [address]  Phone: [phone]  Fax: [phone]

## 2024-05-01 ENCOUNTER — OFFICE VISIT (OUTPATIENT)
Dept: CARDIOLOGY | Facility: MEDICAL CENTER | Age: 24
End: 2024-05-01
Attending: NURSE PRACTITIONER
Payer: MEDICAID

## 2024-05-01 VITALS
BODY MASS INDEX: 30.92 KG/M2 | RESPIRATION RATE: 16 BRPM | DIASTOLIC BLOOD PRESSURE: 76 MMHG | WEIGHT: 197 LBS | SYSTOLIC BLOOD PRESSURE: 120 MMHG | OXYGEN SATURATION: 100 % | HEART RATE: 62 BPM | HEIGHT: 67 IN

## 2024-05-01 DIAGNOSIS — Z84.82 FAMILY HISTORY OF SIDS (SUDDEN INFANT DEATH SYNDROME): ICD-10-CM

## 2024-05-01 PROCEDURE — 3074F SYST BP LT 130 MM HG: CPT | Performed by: INTERNAL MEDICINE

## 2024-05-01 PROCEDURE — 99203 OFFICE O/P NEW LOW 30 MIN: CPT | Performed by: INTERNAL MEDICINE

## 2024-05-01 PROCEDURE — 3078F DIAST BP <80 MM HG: CPT | Performed by: INTERNAL MEDICINE

## 2024-05-01 RX ORDER — METHOCARBAMOL 750 MG/1
750 TABLET, FILM COATED ORAL
COMMUNITY
Start: 2024-04-29 | End: 2024-05-01

## 2024-05-02 LAB — EKG IMPRESSION: NORMAL

## 2024-05-02 PROCEDURE — 93010 ELECTROCARDIOGRAM REPORT: CPT | Performed by: INTERNAL MEDICINE

## 2024-05-06 ENCOUNTER — APPOINTMENT (OUTPATIENT)
Dept: SLEEP MEDICINE | Facility: MEDICAL CENTER | Age: 24
End: 2024-05-06
Attending: NURSE PRACTITIONER
Payer: MEDICAID

## 2024-05-16 ENCOUNTER — APPOINTMENT (OUTPATIENT)
Dept: URGENT CARE | Facility: CLINIC | Age: 24
End: 2024-05-16
Payer: MEDICAID

## 2024-05-22 ENCOUNTER — APPOINTMENT (OUTPATIENT)
Dept: SLEEP MEDICINE | Facility: MEDICAL CENTER | Age: 24
End: 2024-05-22
Attending: NURSE PRACTITIONER
Payer: MEDICAID

## 2024-06-18 ENCOUNTER — APPOINTMENT (OUTPATIENT)
Dept: URGENT CARE | Facility: CLINIC | Age: 24
End: 2024-06-18
Payer: MEDICAID

## 2024-06-19 ENCOUNTER — TELEPHONE (OUTPATIENT)
Dept: CARDIOLOGY | Facility: MEDICAL CENTER | Age: 24
End: 2024-06-19
Payer: MEDICAID

## 2024-06-19 DIAGNOSIS — Z84.82 FAMILY HISTORY OF SIDS (SUDDEN INFANT DEATH SYNDROME): ICD-10-CM

## 2024-06-19 NOTE — TELEPHONE ENCOUNTER
DR REECE  PT CAME IN WITH KUMAR (MRN [id number]) FOR HER APPT WITH DR EDMONDSON.   PT IS REQUESTING WE ORDER THE SAME TESTS FOR HIM THAT DR EDMONDSON ORDERED FOR HIS S.O.  THEY WERE BOTH REF BY SAME PCP, FOR SAME EXACT REASON.  PT WOULD LIKE TO BE CONTACTED.    NM TREADMILL  CARDIAC MONITOR      THANK YOU!!

## 2024-06-25 NOTE — TELEPHONE ENCOUNTER
Can you let him know that she was ordered for the cardiac monitor for palpitations, which he has not been having and I do not think is necessary.    I also do not feel an ETT is necessary as he has no concerning anginal symptoms, however, since this sounds like it would reassure him and they have had significant family trauma, I have placed an order for that.    The most important thing is the genetic testing results, can you check if he has gotten these?

## 2024-07-15 ENCOUNTER — HOSPITAL ENCOUNTER (OUTPATIENT)
Dept: CARDIOLOGY | Facility: MEDICAL CENTER | Age: 24
End: 2024-07-15
Attending: INTERNAL MEDICINE
Payer: MEDICAID

## 2024-07-15 DIAGNOSIS — Z84.82 FAMILY HISTORY OF SIDS (SUDDEN INFANT DEATH SYNDROME): ICD-10-CM

## 2024-07-15 PROCEDURE — 93306 TTE W/DOPPLER COMPLETE: CPT

## 2024-07-16 LAB
LV EJECT FRACT  99904: 55
LV EJECT FRACT MOD 2C 99903: 66.82
LV EJECT FRACT MOD 4C 99902: 63.32
LV EJECT FRACT MOD BP 99901: 64.25

## 2024-07-16 PROCEDURE — 93306 TTE W/DOPPLER COMPLETE: CPT | Mod: 26 | Performed by: INTERNAL MEDICINE

## 2024-07-29 ENCOUNTER — TELEPHONE (OUTPATIENT)
Dept: CARDIOLOGY | Facility: MEDICAL CENTER | Age: 24
End: 2024-07-29
Payer: MEDICAID

## 2024-07-29 DIAGNOSIS — Z82.41 FAMILY HISTORY OF SUDDEN CARDIAC DEATH: ICD-10-CM

## 2024-08-01 ENCOUNTER — TELEPHONE (OUTPATIENT)
Dept: CARDIOLOGY | Facility: MEDICAL CENTER | Age: 24
End: 2024-08-01
Payer: MEDICAID

## 2024-08-01 NOTE — TELEPHONE ENCOUNTER
Received notification from [facility] echo ordered with ICD code updated and fax received. Called [id number] and s/w Adriana regarding findings and she states as echo was re-ordered on 7/29, this is considered a new order and would need the updated ICD code written and signed on fax.  Advised [facility] is out of office and will not be able to obtain signature until at least 8/12/24.    ICD 10 code written on document and placed in [facility] folder awaiting signature.

## 2024-08-13 ENCOUNTER — TELEPHONE (OUTPATIENT)
Dept: CARDIOLOGY | Facility: MEDICAL CENTER | Age: 24
End: 2024-08-13
Payer: MEDICAID

## 2024-08-13 NOTE — TELEPHONE ENCOUNTER
[facility]    Caller: Shaggy Arriola    Topic/issue: Patient called because a second order was placed for an echocardiogram but he is not sure why. He is inquiring if this was an error or something he should go get done again.     Please advise.     Callback Number: [phone]    Thank you,     Marilu SIMS

## 2024-08-14 NOTE — TELEPHONE ENCOUNTER
Received signed document from [facility] and faxed to [facility] [phone] completed status received.    Fax sent to scanning via [employer] for reference, completed status.

## 2024-08-14 NOTE — TELEPHONE ENCOUNTER
Called pt, [phone], to review telephone encounter signed by this RN.  He verbalizes understanding and states no other concerns or questions at this time.  Shaggy is appreciative of information given.

## 2024-08-19 ENCOUNTER — TELEPHONE (OUTPATIENT)
Dept: CARDIOLOGY | Facility: MEDICAL CENTER | Age: 24
End: 2024-08-19
Payer: MEDICAID

## 2024-08-22 ENCOUNTER — HOSPITAL ENCOUNTER (OUTPATIENT)
Dept: RADIOLOGY | Facility: MEDICAL CENTER | Age: 24
End: 2024-08-22
Attending: INTERNAL MEDICINE
Payer: MEDICAID

## 2024-08-22 DIAGNOSIS — Z84.82 FAMILY HISTORY OF SIDS (SUDDEN INFANT DEATH SYNDROME): ICD-10-CM

## 2024-08-22 PROCEDURE — 93017 CV STRESS TEST TRACING ONLY: CPT

## 2024-08-22 PROCEDURE — 93018 CV STRESS TEST I&R ONLY: CPT | Performed by: INTERNAL MEDICINE

## 2024-08-23 ENCOUNTER — TELEPHONE (OUTPATIENT)
Dept: CARDIOLOGY | Facility: MEDICAL CENTER | Age: 24
End: 2024-08-23
Payer: MEDICAID

## 2024-08-23 NOTE — TELEPHONE ENCOUNTER
----- Message from Physician Dereck Drake M.D. sent at 8/22/2024  8:33 PM PDT -----  Please let him know that his stress test was normal and did not show any significant abnormalities.  I am still working with his insurance company to get his echocardiogram approved.

## 2024-08-27 ENCOUNTER — TELEPHONE (OUTPATIENT)
Dept: CARDIOLOGY | Facility: MEDICAL CENTER | Age: 24
End: 2024-08-27
Payer: MEDICAID

## 2024-08-27 NOTE — TELEPHONE ENCOUNTER
To [facility], please advise on alternative diagnosis for echo initially placed 7/15/24 as updated diagnosis was not approved as well, thank you!    Received fax from [facility] (P: [phone] F: [phone]) advising the alternative diagnosis received is also invalid to be used as a principal diagnosis.

## 2024-08-28 ENCOUNTER — TELEPHONE (OUTPATIENT)
Dept: CARDIOLOGY | Facility: MEDICAL CENTER | Age: 24
End: 2024-08-28
Payer: MEDICAID

## 2024-08-28 DIAGNOSIS — R06.02 SHORTNESS OF BREATH: ICD-10-CM

## 2024-08-28 NOTE — TELEPHONE ENCOUNTER
Noted [facility] recommendations.  Will follow up and obtain signature from [facility] when he is back in office Thursday 9/5/2024.  Faxed placed in [facility] folder for safe keeping.    MyChart sent regarding findings.    ===============    Patient Call  Dereck Drake M.D.  You29 minutes ago (3:38 PM)     Okay, I placed the order under 'shortness of breath' as he did complain about that as well. I'm surprised screening for family history of sudden death was not covered, that is typically an acceptable reason to do an echo.

## 2025-02-28 ENCOUNTER — TELEPHONE (OUTPATIENT)
Dept: HEALTH INFORMATION MANAGEMENT | Facility: OTHER | Age: 25
End: 2025-02-28
Payer: MEDICAID
